# Patient Record
Sex: MALE | Race: WHITE | NOT HISPANIC OR LATINO | URBAN - METROPOLITAN AREA
[De-identification: names, ages, dates, MRNs, and addresses within clinical notes are randomized per-mention and may not be internally consistent; named-entity substitution may affect disease eponyms.]

---

## 2023-01-18 ENCOUNTER — OFFICE VISIT (OUTPATIENT)
Dept: FAMILY MEDICINE | Facility: CLINIC | Age: 28
End: 2023-01-18
Payer: COMMERCIAL

## 2023-01-18 VITALS
SYSTOLIC BLOOD PRESSURE: 113 MMHG | TEMPERATURE: 98.7 F | OXYGEN SATURATION: 95 % | DIASTOLIC BLOOD PRESSURE: 75 MMHG | WEIGHT: 275 LBS | RESPIRATION RATE: 16 BRPM | HEART RATE: 89 BPM

## 2023-01-18 DIAGNOSIS — L03.211 CELLULITIS OF FACE: ICD-10-CM

## 2023-01-18 DIAGNOSIS — B35.4 TINEA CORPORIS: Primary | ICD-10-CM

## 2023-01-18 PROCEDURE — 99203 OFFICE O/P NEW LOW 30 MIN: CPT | Performed by: PHYSICIAN ASSISTANT

## 2023-01-18 RX ORDER — CEFADROXIL 500 MG/1
500 CAPSULE ORAL 2 TIMES DAILY
Qty: 20 CAPSULE | Refills: 0 | Status: SHIPPED | OUTPATIENT
Start: 2023-01-18 | End: 2023-01-28

## 2023-01-18 RX ORDER — KETOCONAZOLE 20 MG/G
CREAM TOPICAL DAILY
Qty: 30 G | Refills: 0 | Status: SHIPPED | OUTPATIENT
Start: 2023-01-18 | End: 2023-01-28

## 2023-01-18 RX ORDER — ALBUTEROL SULFATE 90 UG/1
2 AEROSOL, METERED RESPIRATORY (INHALATION)
COMMUNITY
Start: 2022-12-01

## 2023-01-18 NOTE — PROGRESS NOTES
Patient presents with:  Facial Pain: Left side of face      Clinical Decision Making:  Treat with ketoconazole for the tinea corporis on the left chin.  Use of internal antibiotic for the redness and irritation on the left cheek.  Patient may apply hot packs 3 times per day. Expected course of resolution and indication for return was gone over and questions were answered to patient/parent's satisfaction before discharge.        ICD-10-CM    1. Tinea corporis  B35.4 ketoconazole (NIZORAL) 2 % external cream      2. Cellulitis of face  L03.211 cefadroxil (DURICEF) 500 MG capsule          Patient Instructions   Wash the area once daily keep it clean and dry.  Apply topical antifungal.  He may take the oral antibiotic for the infection on the cheek.          HPI:  Sal Nicholas is a 27 year old male who is currently in outpatient treatment for drug rehab who is presenting for 2 complaints.  First complaint is that there is cellulitis or rash on the left face.  He also has full facial hair with beard and has a small area of irritation and redness on the chin.  He has loss of hair at that area.  This just happened this week.  No treatment was tried for either of these problems.  Currently does not have constitutional symptoms to include fever chills night sweats or fatigue.    History obtained from chart review and the patient.    Problem List:  There are no relevant problems documented for this patient.      No past medical history on file.    Social History     Tobacco Use     Smoking status: Never     Smokeless tobacco: Never   Substance Use Topics     Alcohol use: Not on file       Review of Systems  As above in HPI otherwise negative.    Vitals:    01/18/23 1704   BP: 113/75   BP Location: Left arm   Patient Position: Sitting   Cuff Size: Adult Large   Pulse: 89   Resp: 16   Temp: 98.7  F (37.1  C)   TempSrc: Oral   SpO2: 95%   Weight: 124.7 kg (275 lb)       General: Patient is resting comfortably no acute  distress is afebrile  HEENT: Head is normocephalic atraumatic   eyes are PERRL EOMI sclera anicteric   TMs are clear bilaterally  Throat is without pharyngeal wall erythema and no exudate  Left TM joint has slight crepitance to palpation.  Left cheek is with mild erythema but no full skin breakdown or abscess.  The left chin has a 2 cm diameter area of erythema with a scaly advancing border and loss of hair.  Does appear to be consistent with a fungal infection/tinea.  No cervical lymphadenopathy present      Physical Exam    At the end of the encounter, I discussed results, diagnosis, medications. Discussed red flags for immediate return to clinic/ER, as well as indications for follow up if no improvement. Patient understood and agreed to plan. Patient was stable for discharge.

## 2023-01-18 NOTE — PATIENT INSTRUCTIONS
Wash the area once daily keep it clean and dry.  Apply topical antifungal.  He may take the oral antibiotic for the infection on the cheek.